# Patient Record
Sex: MALE | Race: WHITE
[De-identification: names, ages, dates, MRNs, and addresses within clinical notes are randomized per-mention and may not be internally consistent; named-entity substitution may affect disease eponyms.]

---

## 2017-07-26 ENCOUNTER — HOSPITAL ENCOUNTER (EMERGENCY)
Dept: HOSPITAL 47 - EC | Age: 27
Discharge: HOME | End: 2017-07-26
Payer: COMMERCIAL

## 2017-07-26 VITALS — TEMPERATURE: 97.8 F | HEART RATE: 77 BPM | SYSTOLIC BLOOD PRESSURE: 147 MMHG | DIASTOLIC BLOOD PRESSURE: 79 MMHG

## 2017-07-26 VITALS — RESPIRATION RATE: 18 BRPM

## 2017-07-26 DIAGNOSIS — Z87.828: ICD-10-CM

## 2017-07-26 DIAGNOSIS — S62.604B: Primary | ICD-10-CM

## 2017-07-26 DIAGNOSIS — Z79.4: ICD-10-CM

## 2017-07-26 DIAGNOSIS — T63.441A: ICD-10-CM

## 2017-07-26 DIAGNOSIS — S61.214A: ICD-10-CM

## 2017-07-26 DIAGNOSIS — Z88.1: ICD-10-CM

## 2017-07-26 DIAGNOSIS — Z79.899: ICD-10-CM

## 2017-07-26 DIAGNOSIS — Z88.2: ICD-10-CM
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
## 2017-07-26 DIAGNOSIS — E11.9: ICD-10-CM

## 2017-07-26 PROCEDURE — 90715 TDAP VACCINE 7 YRS/> IM: CPT

## 2017-07-26 PROCEDURE — 96372 THER/PROPH/DIAG INJ SC/IM: CPT

## 2017-07-26 PROCEDURE — 99283 EMERGENCY DEPT VISIT LOW MDM: CPT

## 2017-07-26 PROCEDURE — 90471 IMMUNIZATION ADMIN: CPT

## 2017-07-26 PROCEDURE — 73140 X-RAY EXAM OF FINGER(S): CPT

## 2017-07-26 PROCEDURE — 12001 RPR S/N/AX/GEN/TRNK 2.5CM/<: CPT

## 2017-07-26 NOTE — XR
EXAMINATION TYPE: XR finger RT

 

DATE OF EXAM: 7/26/2017

 

CLINICAL HISTORY: pain

 

Right fourth digit.

 

TECHNIQUE: 3 views of the right fourth digit are submitted.

 

COMPARISON: None

 

FINDINGS: Comminuted subungual tuft fracture right fourth digit. Joint spaces are well-preserved.  Co
rrelate for soft tissue injury.  

 

IMPRESSION: Comminuted subungual tuft fracture right fourth digit.

## 2017-07-26 NOTE — ED
General Adult HPI





- General


Chief complaint: Extremity Injury, Upper


Stated complaint: CRUSHING INJURY RT RING FINGER AND BEE STINGS


Time Seen by Provider: 07/26/17 18:17


Source: patient, RN notes reviewed


Mode of arrival: ambulatory


Limitations: no limitations





- History of Present Illness


Initial comments: 





26-year-old male presents to the emergency department with a chief complaint of 

right ring finger laceration.  Patient states that he caught in the door today.

  Patient states worse touching tobleeding so he was concerned.  He does not 

recall his last tetanus.  Patient states he also by chance was stung by bees 

earlier.  Patient states that this happened.  He does not want anything for 

this he'll not be evaluated for his ring finger injury.  Patient denies any 

other symptoms at this time.Patient denies any recent fever, chills, shortness 

of breath, chest pain, back pain, abdominal pain, nausea vomiting, numbness or 

tingling, dysuria or hematuria, constipation or diarrhea, headaches or visual 

changes, or any other current symptoms.





- Related Data


 Home Medications











 Medication  Instructions  Recorded  Confirmed


 


Cholecalciferol [Vitamin D3] 1,000 unit PO DAILY 07/26/17 07/26/17


 


Hydrocortisone Cream 1 applic TOPICAL ONCE PRN 07/26/17 07/26/17





[Hydrocortisone 1% Cream]   


 


INSULIN LISPRO (humaLOG) [humaLOG See Protocol SQ CONTINUOUS 07/26/17 07/26/17





(formulary)]   


 


Lisinopril [Zestril] 2.5 mg PO DAILY 07/26/17 07/26/17








 Previous Rx's











 Medication  Instructions  Recorded


 


Cephalexin [Keflex] 500 mg PO Q6HR #40 cap 07/26/17











 Allergies











Allergy/AdvReac Type Severity Reaction Status Date / Time


 


erythromycin base Allergy Severe Nausea & Verified 07/26/17 18:27





[Erythromycin Base]   Vomiting  


 


Sulfa (Sulfonamide Allergy Severe Rash/Hives Verified 07/26/17 18:27





Antibiotics)     














Review of Systems


ROS Statement: 


Those systems with pertinent positive or pertinent negative responses have been 

documented in the HPI.





ROS Other: All systems not noted in ROS Statement are negative.





Past Medical History


Past Medical History: Asthma, Diabetes Mellitus


Additional Past Medical History / Comment(s): LEFT ARM FRACTURE. RIGHT ANKLE 

FRACTURE.


History of Any Multi-Drug Resistant Organisms: None Reported


Past Surgical History: Adenoidectomy


Past Anesthesia/Blood Transfusion Reactions: No Reported Reaction


Past Psychological History: No Psychological Hx Reported


Smoking Status: Never smoker


Past Alcohol Use History: Rare


Past Drug Use History: None Reported





General Exam





- General Exam Comments


Initial Comments: 





General:  The patient is awake and alert, in no distress, and does not appear 

acutely ill.   


Neck:  The neck is supple, there is no tenderness.


Cardiovascular:  There is a regular rate and rhythm. No murmur, rub or gallop 

is appreciated.


Respiratory:  Lungs are clear to auscultation, respirations are non-labored, 

breath sounds are equal.  No wheezes, stridor, rales, or rhonchi.


Musculoskeletal: Sensation intact with 2+ pulses of the right upper extremity.  

Patient does appear to 1/2 cm laceration through the nail of the right ring 

finger.  Patient will have full range of motion.


Neurological:  CN II-XII intact, There are no obvious motor or sensory 

deficits. Coordination appears grossly intact. Speech is normal.


Skin:  Skin is warm and dry and no rashes or lesions are noted. 


Psychiatric:  Normal mood and affect.  


Limitations: no limitations





Course


 Vital Signs











  07/26/17





  18:07


 


Temperature 97.7 F


 


Pulse Rate 75


 


Respiratory 18





Rate 


 


Blood Pressure 162/97


 


O2 Sat by Pulse 97





Oximetry 














Procedures





- Procedures


Initial comment: 





The skin was anesthetized with 1% lidocaine by digital block to the right ring 

finger. The laceration was then cleansed with Betadine and irrigated with 

normal saline.  The laceration does go through the nail the proximal nail is 

intact.  The wound was inspected, and there was no evidence of injury to deep 

structures. No foreign body was noted in the wound. A total of 2 skin sutures 

were placed utilizing 2 5-0 nylon to a 1.5 from a laceration to the right finger








- Orthopedic Splinting/Casting


  ** Injury #1


Side: right


Upper Extremity Injury Location: finger


Upper Extremity Immobilizer: aluminum form splint





Medical Decision Making





- Medical Decision Making





26-year-old male presents for right ring finger injury.  This time patient 

underwent an x-ray and suture repair.  There does appear to be an open 

fracture.  He was given Ancef here.  Keflex for home.  We discussed follow-up 

with or so.  He was splinted.  Patient agreement with plan.   We discussed care 

follow-up and return parameters all patient's questions.  He stated that he 

understood and he is in agreement with plan.  All questions have been answered.

  This time patient will be discharged home.





- Radiology Data


Radiology results: report reviewed, image reviewed





Disposition


Clinical Impression: 


 Laceration of right ring finger, Open fracture of finger of right hand





Disposition: HOME SELF-CARE


Condition: Stable


Instructions:  Care For Your Stitches (ED), Laceration (ED)


Additional Instructions: 


Please use medication as discussed. Please follow up with family doctor if 

symptoms have not improved over the next two days. Please return to the 

emergency room if your symptoms increase or worsen or for any other concerns. 





Please return to the emergency room in 8-10 days to have sutures removed. 

Please leave wound covered for the first 24-48 hours and then leave open to air 

after that time. Please use clean soap and water to clean the suture area to 

prevent scabbing over the top of your sutures. Please watch for any signs of 

infection which may include but not limited to increased pain, swelling, redness

, fever or chills. Please return to the emergency room if any signs of 

infection do occur. Please return to the emergency room for any other concerns 

or complications. 


Prescriptions: 


Cephalexin [Keflex] 500 mg PO Q6HR #40 cap


Referrals: 


Loc George MD [Primary Care Provider] - 1-2 days


Time of Disposition: 19:17

## 2021-07-14 ENCOUNTER — HOSPITAL ENCOUNTER (OUTPATIENT)
Dept: HOSPITAL 47 - SLEEP | Age: 31
End: 2021-07-14
Attending: INTERNAL MEDICINE
Payer: COMMERCIAL

## 2021-07-14 DIAGNOSIS — I10: ICD-10-CM

## 2021-07-14 DIAGNOSIS — Z79.899: ICD-10-CM

## 2021-07-14 DIAGNOSIS — Z79.4: ICD-10-CM

## 2021-07-14 DIAGNOSIS — R06.83: Primary | ICD-10-CM

## 2021-07-14 DIAGNOSIS — E66.9: ICD-10-CM

## 2021-07-14 DIAGNOSIS — Z87.81: ICD-10-CM

## 2021-07-14 DIAGNOSIS — E11.9: ICD-10-CM

## 2021-07-14 DIAGNOSIS — Z88.2: ICD-10-CM

## 2021-07-14 DIAGNOSIS — J45.909: ICD-10-CM

## 2021-07-14 DIAGNOSIS — R35.1: ICD-10-CM

## 2021-07-14 DIAGNOSIS — Z88.1: ICD-10-CM

## 2021-07-14 PROCEDURE — 99211 OFF/OP EST MAY X REQ PHY/QHP: CPT

## 2021-07-14 NOTE — CONS
CONSULTATION



DATE OF SERVICE:

07/14/2021



REASON FOR CONSULTATION:

A 30-year-old gentleman admitted to the sleep center for possible sleep apnea-hypopnea

syndrome.



HISTORY OF PRESENT ILLNESS/SLEEP WAKE EVALUATION:

Sleep evaluation patient usual sleep schedule from 7:30 a.m. until 6:30 pm because

patient works at night shift and on days off,  He sleeps from 11 p.m. until 9:30 a.m.

Usually no problems with falling asleep.  No TV in bedroom. During the sleep, he snores

and wakes up 2 times with nocturia.



Followup 5 years, patient increased his weight on about 50 pounds.



[place] Sleepiness Scale is 5, which is in normal range, but the patient takes one nap

in the afternoon.



No history of hypnagogic hallucinations, sleep paralysis or cataplexy.



PAST MEDICAL HISTORY:

Positive for diabetes mellitus. History of asthma.



PAST SURGICAL HISTORY:

Surgery for left arm fracture and for right ankle fracture.



MEDICATIONS:

NovoLog.  Lisinopril 2.5 mg once a day, vitamin D supplement.



REVIEW OF SYSTEMS:

Multiple awakenings from sleep.



FAMILY HISTORY:

Hypertension, heart problems, cancer.



SOCIAL HISTORY:

Negative for smoking or using alcohol.  The patient is preparing for driving

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



REVIEW OF SYSTEMS:

Snoring, awakenings from sleep.



PHYSICAL EXAM:

A pleasant  gentleman without distress.

VITAL SIGNS: /73, HR 85, RR 18, height 6 feet 5-1/4 inches, weight 242.4 pounds,

body mass index 40.3, temperature 97.5, oxygen saturation at room air 90%. Oropharynx (

) position of soft palate (  ) normal. Mallampati II. Neck is wide, 17-1/4 inches in

circumference.

HEENT: PERRLA, EOMI, evaluation of oropharynx showed tongue protrudes midline.

NECK:  Supple, no JVD.  Thyroid is not palpable.

LUNGS:  Clear to percussion and to auscultation.  Good air exchange.  No wheezing or

rhonchi.

HEART:  S1, S2 regular.  No murmurs, gallops, or rubs.

ABDOMEN:  Soft and nontender.  Bowel sounds are present.  No organomegaly appreciated.

EXTREMITIES: No clubbing or cyanosis.

CNS:  Awake, alert, and oriented X3.  Cranial nerves 2 to 7 intact.  There is no

fasciculation or atrophy. noted.  No focal deficits observed.



IMPRESSION:

1. Snoring, awakenings from sleep with nocturia, wide neck 17-1/4 inches in

    circumference.  Possible obstructive sleep apnea-hypopnea syndrome.

2. Obesity, body mass index 40.3.

3. Diabetes mellitus.

4. Asthma.

5. Hypertension in the office.

6. Status post left arm fracture.

7. Status post right ankle fracture.

8. Patient is a .



PLAN:

1. Patient will continue to use PAP equipment every night for the whole night.

2. Sleep hygiene with regular time in bed for at least 7-1/2 to 8 hours.

3. Precautions related to driving. No driving if feeling sleepiness.

4. I will maintain all necessary prescription for PAP supplies including mask, tube,

    filters.

5. Watching weight.

6. Follow-up visit in 6 months or earlier if patient has any problems.



Thank you very much for referring this patient for consultation.



Sincerely,







Heriberto Ponce MD, PhD, FAASM

Diplomat of [employer]

Sleep Medicine Board of [employer]

Medical Director of [facility] [place]





MMODL / [place]ONN: [id number] / Job#: [id number]